# Patient Record
(demographics unavailable — no encounter records)

---

## 2024-11-14 NOTE — HISTORY OF PRESENT ILLNESS
[Home] : at home, [unfilled] , at the time of the visit. [Medical Office: ([facility])___] : at the medical office located in  [Verbal consent obtained from patient] : the patient, [unfilled] [de-identified] : 74 y/o man with multiple medication problems presents for pain management. Pt has been on multiple courses of Abx for wound infections, etc and had C diff. Stll with diarrhea. Recent ID labs pending.  Meds refilled. [place]-STOP [place].

## 2024-11-18 NOTE — HISTORY OF PRESENT ILLNESS
[FreeTextEntry1] : 75M h/o DM (A1c 7.1%), PAD s/p prior R fem endarterectomy (1/19/2024) complicated by groin infection, R foot OM s/p angio with balloon antioplasty 9/12/24 s/p R 1st ray and partial 5th ray amputation 9/13/24 with clean margin, emphysema, BPH, factor V Leiden with prior DVTs (on Eliquis), CDI p/w 5th stump site erythema and purulent drainage x 1 week.  Patient was admitted from 9/9-9/20/24 for R foot infection, found to have 1st and 5th toe OM. He underwent angio and amputation of 1st and 5th toe as above on 9/13 with clean margin. Outpatient WCx grew MRSA, PsA, Proteus rettgeri, and OR culture grew PsA.  He was on dapto/cefepime/flagyl in-house, then was sent home with doxy/cipro, total 2 weeks abx course (EOT 9/26/24).  he was also on C.diff ppx as well. After discharge, he initially did well and wound healed, no e/o infection. Then about a week PTA, he noticed erythema and purulent diranage from the 5th MT surgical site. He also developed chills. he had diarrhea as well. His wife urged him to go to the ED and he was admitted on 10/18. Upon arrival, he was afebrile, VSS, lab notable for WBC 11.24, Hgb 9.1, lactate 3, Cr 1.03.  CT RLE showed recurrent focal OM at R 5th TM amputation spump. He was put on vanc/zosyn and seen by podiatry, bone biopsy was done on 10/19 and sent for culture. 10/18 BCx growing MRSA. C.diff negative.  ID was consulted for abx rec. Exam notable for necrotic amputation stump at 1th and 5th MT head with surrounding erythema on foot.  Patient here with MRSA bacteremia 2/2 R foot OM at stump site and cellulitis.

## 2024-12-09 NOTE — PHYSICAL EXAM
[No Acute Distress] : no acute distress [Normal Sclera/Conjunctiva] : normal sclera/conjunctiva [Normal Outer Ear/Nose] : the outer ears and nose were normal in appearance [No JVD] : no jugular venous distention [No Respiratory Distress] : no respiratory distress  [No Accessory Muscle Use] : no accessory muscle use [Clear to Auscultation] : lungs were clear to auscultation bilaterally [Normal Rate] : normal rate  [Normal Affect] : the affect was normal [Alert and Oriented x3] : oriented to person, place, and time [de-identified] : R foot bandanged [de-identified] : severe fungal dermatitis, [de-identified] : using rollator

## 2024-12-09 NOTE — REVIEW OF SYSTEMS
[Fatigue] : fatigue [Wheezing] : no wheezing [Dyspnea on Exertion] : dyspnea on exertion [Joint Pain] : joint pain [Back Pain] : back pain [Dizziness] : no dizziness [Negative] : Heme/Lymph [FreeTextEntry9] : using rollator [de-identified] : dermatitis under pannus

## 2024-12-09 NOTE — HISTORY OF PRESENT ILLNESS
[de-identified] : Pt presents for f/u. Multiple medical problems and complicated last few months of care. Now s/p IV daptomycin 650 gm q24h along with IV Ceftriaxone 2gm q24h(10/20-11/30) and PO Vancomycin 125mg Q12h until 12/7. [facility]. Needs flu vaccine. Has fungal dermatitis. Ran out of Nystatin a while ago and has been using Lamisil OTC spray.

## 2024-12-20 NOTE — PHYSICAL EXAM
[de-identified] : General:  Appearance is consistent with chronologic age.  Cognitive/Language:  Awake, alert, and oriented to person, place, time and date Respiratory: Non-labored breathing, no audible wheezes Inspection: Right foot toe amputations. Right foot in cast Motor examination:  Upper Extremities: L 5/5, R 5/5; Lower extremities: L 5/5, R 5/5 Sensory examination:   Decreased sensation to light touch in b/l LEs Reflexes:   2+ b/l biceps, triceps, brachioradialis, patella and achilles Gait: Ambulates with rolling walker

## 2024-12-20 NOTE — ASSESSMENT
[FreeTextEntry1] : 75M with a PMH of Factor V Leiden (on Eliquis), DM (complicated by peripheral neuropathy), CAD, PAD (osteomyelitis and subsequent toe amputations on right foot), chronic neck pain, and chronic lower back pain presenting for initial evaluation.  Impression - Patient presents with severe pain in the lower back that is always present, localized to mid lower back, associated with radiation down b/l buttocks into posterior thighs, exacerbated by all activities (prolonged sitting/standing, lifting). He also complains of chronic neck pain involves entire neck, without radiation into UEs. He previously received cervical and lumbar ESIs with good results. However, given his Factor V Leiden, he will need clearance from his Hematologist to hold Eliquis and Cilostazol prior to any injections.  Plan - Obtain MR C-spine and L-spine - Obtain clearance from Hematologist to hold Eliquis for 3 days and Cilostazol for 2 days prior to any epidural injections - Specific levels of injections pending new imaging

## 2024-12-20 NOTE — HISTORY OF PRESENT ILLNESS
[FreeTextEntry1] : Patient is a 75M with a PMH of Factor V Leiden (on Eliquis), DM (complicated by peripheral neuropathy), CAD, PAD (osteomyelitis and subsequent toe amputations on right foot), chronic neck pain, and chronic lower back pain presenting for initial evaluation. - Previously followed with Dr. Jiménez who administered ESIs in neck (last was a LEAH at C7-T1 on 7/20/21 and lower back (last was a caudal on 9/14/21), as well as left shoulder intra-articular injections - He reports most severe pain in the lower back that is always present, localized to mid lower back, associated with radiation down b/l buttocks into posterior thighs, exacerbated by all activities (prolonged sitting/standing, lifting) - Has not had any recent MRIs, but recalls being told that he may need surgery for lumbar spinal stenosis - Neck pain involves entire neck, without radiation into UEs - Currently takes Tylenol and Tramadol 50mg qhs prn for pain

## 2024-12-24 NOTE — ASSESSMENT
[Arterial/Venous Disease] : arterial/venous disease [Ulcer Care] : ulcer care [FreeTextEntry1] : 75yoM who presented to the office w/an open wound of the R great toe after a traumatic injury w/avulsion of the skin over the IP joint, found to have significant femoral disease, now s/p R FEA/PFEA and distal R great toe amputation and evacuation of infected R groin post-op seroma that required readmission to [facility]. Pt recently was admitted due to his R lateral foot and R great toe wounds which have not been healing. He underwent RLE angiogram/ AT balloon angioplasty on 9/12/24, as well as s/p R 1st and 5th toe amp with podiatry. He returns today for a post-op visit. He is feeling well, has minimal pain at his right foot, denies drainage from incisions, fever, chills, but states that his wounds are not healing.  On exam, Right 1st and 5th toes amputation sites with fibrinous tissue, no granulation tissue, necrotic tissue at the 5th toe amputation site. RUE Arterial duplex was done in the office that demonstrated diffuse fibrocalcific plaque in femoropopliteal and tibial arteries, patent CFA endarterectomy, >50% stenosis in profunda, no flow in PT and peroneal arteries. We discussed the findings and recommended to proceed with RLE angio to possibly improve his distal arterial circulations. In the meantime, to follow up with his podiatrist, continue wound care. Pt agreed to proceed w/RLE Angio/PTA on 12/30/24. He will hold Eliquis for 2 days.

## 2024-12-24 NOTE — HISTORY OF PRESENT ILLNESS
[FreeTextEntry1] : 75yoM w/PMHx of T2DM, HTN, HLD, emphysema, BPH, factor V Leiden with prior DVTs (on Eliquis), PTSD, PAD with single vessel run off, chronic RLE wound s/p STSG (2019), bariatric surgery with complications 2005, C diff s/p treatment, s/p recent R FEA and 1st toe amputation for R 1st toe wound on 1/19/24, multiple recent admissions 3/20-3/25 and 3/31 to 4/3/24 for recurrent R foot wound who was admitted again with new MRSA infection of R 1st toe and lateral foot wound with drainage. Patient underwent RLE angiogram/ AT balloon angioplasty on 9/12/24, as well as s/p R 1st and 5th toe amp with podiatry. He returns today for a post-op visit. He is feeling well, has minimal pain at his right foot, denies drainage from incisions, fever, chills. He has wound care by [facility]. He states that his wounds are not healing well, he follows with Dr. Cheney (DPM) and has a follow up appointment with her on Friday. He denies pain, fever, chills.

## 2024-12-24 NOTE — PHYSICAL EXAM
[2+] : left 2+ [1+] : left 1+ [0] : left 0 [Ankle Swelling (On Exam)] : present [Ankle Swelling Bilaterally] : bilaterally  [Ankle Swelling On The Right] : mild [No HSM] : no hepatosplenomegaly [Skin Ulcer] : ulcer [Alert] : alert [Oriented to Person] : oriented to person [Oriented to Place] : oriented to place [Oriented to Time] : oriented to time [Calm] : calm [Varicose Veins Of Lower Extremities] : not present [] : not present [Abdomen Masses] : No abdominal masses [Abdomen Tenderness] : ~T ~M No abdominal tenderness [de-identified] : Obese habitus, pleasant [de-identified] : FROM throughout, strength 5/5x4 [de-identified] : Right 1st and 5th toes amputation sites with fibrinous tissue, no granulation tissue, necrotic tissue at the 5th toe amputaion site.

## 2024-12-24 NOTE — ASSESSMENT
[Arterial/Venous Disease] : arterial/venous disease [Ulcer Care] : ulcer care [FreeTextEntry1] : 75yoM who presented to the office w/an open wound of the R great toe after a traumatic injury w/avulsion of the skin over the IP joint, found to have significant femoral disease, now s/p R FEA/PFEA and distal R great toe amputation and evacuation of infected R groin post-op seroma that required readmission to [facility]. Pt recently was admitted due to his R lateral foot and R great toe wounds which have not been healing. He underwent RLE angiogram/ AT balloon angioplasty on 9/12/24, as well as s/p R 1st and 5th toe amp with podiatry. He returns today for a post-op visit. He is feeling well, has minimal pain at his right foot, denies drainage from incisions, fever, chills, but states that his wounds are not healing.  On exam, Right 1st and 5th toes amputation sites with fibrinous tissue, no granulation tissue, necrotic tissue at the 5th toe amputation site. RUE Arterial duplex was done in the office that demonstrated diffuse fibrocalcific plaque in femoropopliteal and tibial arteries, patent CFA endarterectomy, >50% stenosis in profunda, no flow in PT and peroneal arteries. We discussed the findings and recommended to proceed with RLE angio to possibly improve his distal arterial circulations. In the meantime, to follow up with his podiatrist, continue wound care. Pt agreed to proceed w/RLE Angio/PTA on 12/30/24. He will hold Eliquis for 2 days.

## 2024-12-24 NOTE — PROCEDURE
[FreeTextEntry1] : RUE Arterial duplex was done in the office that demonstrated diffuse fibrocalcific plaque in femoropopliteal and tibial arteries, patent CFA endarterectomy, >50% stenosis in profunda, no flow in PT and peroneal arteries

## 2024-12-24 NOTE — PHYSICAL EXAM
[2+] : left 2+ [1+] : left 1+ [0] : left 0 [Ankle Swelling (On Exam)] : present [Ankle Swelling Bilaterally] : bilaterally  [Ankle Swelling On The Right] : mild [No HSM] : no hepatosplenomegaly [Skin Ulcer] : ulcer [Alert] : alert [Oriented to Person] : oriented to person [Oriented to Place] : oriented to place [Oriented to Time] : oriented to time [Calm] : calm [Varicose Veins Of Lower Extremities] : not present [] : not present [Abdomen Masses] : No abdominal masses [Abdomen Tenderness] : ~T ~M No abdominal tenderness [de-identified] : Obese habitus, pleasant [de-identified] : FROM throughout, strength 5/5x4 [de-identified] : Right 1st and 5th toes amputation sites with fibrinous tissue, no granulation tissue, necrotic tissue at the 5th toe amputaion site.

## 2024-12-24 NOTE — ADDENDUM
[FreeTextEntry1] : I, Dr. Cruz Parra, personally performed the evaluation and management (E/M) services for this established patient who presents today with (an) existing condition(s).  That E/M includes conducting the examination, assessing all conditions, and (re)establishing/reinforcing a plan of care.  Today, my ACP, Angy SWANSON, was here to observe my evaluation and management services for this condition to be followed going forward.  I spent a total of 40 minutes in this encounter.

## 2024-12-24 NOTE — REVIEW OF SYSTEMS
[As Noted in HPI] : as noted in HPI [Skin Wound] : skin wound [Negative] : Musculoskeletal [Fever] : no fever [Chills] : no chills [Limb Pain] : no limb pain [Limb Swelling] : no limb swelling

## 2024-12-24 NOTE — ASSESSMENT
[Arterial/Venous Disease] : arterial/venous disease [Ulcer Care] : ulcer care [FreeTextEntry1] : 75yoM who presented to the office w/an open wound of the R great toe after a traumatic injury w/avulsion of the skin over the IP joint, found to have significant femoral disease, now s/p R FEA/PFEA and distal R great toe amputation and evacuation of infected R groin post-op seroma that required readmission to [facility]. Pt recently was admitted due to his R lateral foot and R great toe wounds which have not been healing. He underwent RLE angiogram/ AT balloon angioplasty on 9/12/24, as well as s/p R 1st and 5th toe amp with podiatry. He returns today for a post-op visit. He is feeling well, has minimal pain at his right foot, denies drainage from incisions, fever, chills, but states that his wounds are not healing.  On exam, Right 1st and 5th toes amputation sites with fibrinous tissue, no granulation tissue, necrotic tissue at the 5th toe amputation site. RUE Arterial duplex was done in the office that demonstrated diffuse fibrocalcific plaque in femoropopliteal and tibial arteries, patent CFA endarterectomy, >50% stenosis in profunda, no flow in PT and peroneal arteries. We discussed the findings and recommended to proceed with RLE angio to possibly improve his distal arterial circulations. In the meantime, to follow up with his podiatrist, continue wound care. Pt agreed to proceed w/RLE Angio/PTA on 12/30/24. He will hold Eliquis for 2 days.

## 2024-12-24 NOTE — PHYSICAL EXAM
[2+] : left 2+ [1+] : left 1+ [0] : left 0 [Ankle Swelling (On Exam)] : present [Ankle Swelling Bilaterally] : bilaterally  [Ankle Swelling On The Right] : mild [No HSM] : no hepatosplenomegaly [Skin Ulcer] : ulcer [Alert] : alert [Oriented to Person] : oriented to person [Oriented to Place] : oriented to place [Oriented to Time] : oriented to time [Calm] : calm [Varicose Veins Of Lower Extremities] : not present [] : not present [Abdomen Masses] : No abdominal masses [Abdomen Tenderness] : ~T ~M No abdominal tenderness [de-identified] : Obese habitus, pleasant [de-identified] : FROM throughout, strength 5/5x4 [de-identified] : Right 1st and 5th toes amputation sites with fibrinous tissue, no granulation tissue, necrotic tissue at the 5th toe amputaion site.

## 2025-01-13 NOTE — PHYSICAL EXAM
[No Acute Distress] : no acute distress [Normal Sclera/Conjunctiva] : normal sclera/conjunctiva [Normal Outer Ear/Nose] : the outer ears and nose were normal in appearance [No JVD] : no jugular venous distention [No Respiratory Distress] : no respiratory distress  [Normal Rate] : normal rate  [No Rash] : no rash [Normal Affect] : the affect was normal [Alert and Oriented x3] : oriented to person, place, and time [de-identified] : using rollator

## 2025-01-13 NOTE — HISTORY OF PRESENT ILLNESS
[de-identified] : 75yoM w/PMHx of T2DM, HTN, HLD, emphysema, BPH, factor V Leiden with prior DVTs (on Eliquis), PTSD, PAD with single vessel run off, chronic RLE wound s/p STSG (2019), bariatric surgery with complications 2005, C diff s/p treatment, s/p R FEA and 1st toe amputation for R 1st toe wound on 1/19/24, multiple recent admissions 3/20-3/25 and 3/31 to 4/3/24 for recurrent R foot wound who was admitted again with new MRSA infection of R 1st toe and lateral foot wound with drainage. Patient underwent RLE angiogram/ AT balloon angioplasty on 9/12/24, as well as s/p R 1st and 5th toe amp with podiatry. RUE Arterial duplex was done in the office that demonstrated diffuse fibrocalcific plaque in femoropopliteal and tibial arteries, patent CFA  Seeing luzmaria this week for a plan.   Seeing Dr Whitman for pain management. She plans to do epidural injection but Dr Whitman needs medication management advice given his comorbidities.  Previously followed with Dr. Jiménez who administered ESIs in neck (last was a LEAH at C7-T1 on 7/20/21 and lower back (last was a caudal on 9/14/21), as well as left shoulder intra-articular injections. He is on chronic opioids.

## 2025-01-13 NOTE — REVIEW OF SYSTEMS
[Fever] : no fever [Chills] : no chills [Joint Pain] : joint pain [Back Pain] : back pain [Headache] : no headache [Negative] : Heme/Lymph

## 2025-01-13 NOTE — ASSESSMENT
[FreeTextEntry1] : Pt can hold his Eliquis and Cilostazol for three and 2 days respectively prior to his injection and should restart medications after.

## 2025-01-13 NOTE — HEALTH RISK ASSESSMENT
[Good] : ~his/her~  mood as  good [No falls in past year] : Patient reported no falls in the past year [0] : 2) Feeling down, depressed, or hopeless: Not at all (0) [PHQ-2 Negative - No further assessment needed] : PHQ-2 Negative - No further assessment needed [Former] : Former [15-19] : 15-19 [> 15 Years] : > 15 Years [Feels Safe at Home] : Feels safe at home [Fully functional (bathing, dressing, toileting, transferring, walking, feeding)] : Fully functional (bathing, dressing, toileting, transferring, walking, feeding) [[id number]] : 0 [Change in mental status noted] : No change in mental status noted [Language] : denies difficulty with language [Behavior] : denies difficulty with behavior [Learning/Retaining New Information] : denies difficulty learning/retaining new information [Handling Complex Tasks] : denies difficulty handling complex tasks [Reasoning] : denies difficulty with reasoning [Spatial Ability and Orientation] : denies difficulty with spatial ability and orientation [With Family] : lives with family [de-identified] : poor ambulation, wife and daughter help

## 2025-02-10 NOTE — HISTORY OF PRESENT ILLNESS
[de-identified] : 75-year-old male presents as new patient for evaluation of chronic pain in the low back left shoulder and neck as well as the right lower extremity.  He was a previous patient of Dr. Jiménez who administered several epidural injections in the lumbar spine that reliably provided him 7 to 9 months of near complete relief pain in the low back as well as radiating to the right lower extremity glued hamstring to the calf and foot diffusely.  He also describes chronic stiffness and tightness in the neck as well as left shoulder pain which has also been managed in the past with injections with very positive result.  Over the past year he has undergone several vascular procedures in the right lower extremity including amputation of several toes

## 2025-02-10 NOTE — ASSESSMENT
[FreeTextEntry1] : 75-year-old male with chronic low back pain right lower extremity lumbar radiculopathy in the setting multilevel degenerative changes spinal stenosis

## 2025-02-10 NOTE — DISCUSSION/SUMMARY
[de-identified] : We reviewed the imaging in the office today as well as my impression of his symptoms.  He does seem to have had very positive relief from epidural injections in the past and I have made a referral to have these completed with one of my colleagues.  He understands that they also may be to admit able to administer some procedures for the neck and left shoulder.  I discussed surgical decompression as last start option should the symptoms fail to be ameliorated satisfactorily by the injections.  We also discussed the potential utility of physical therapy however would prefer to proceed with injections at this point given some limitations with regards to the recent right lower extremity procedures  I have spent greater than 60 minutes preparing to see the patient, collecting relevant history, performing a thorough history and physical examination, counseling the patient regarding my findings ordering the appropriate therapies and tests, communicating with other relevant healthcare professionals, documenting my encounter and coordinating care.

## 2025-02-10 NOTE — DISCUSSION/SUMMARY
[de-identified] : We reviewed the imaging in the office today as well as my impression of his symptoms.  He does seem to have had very positive relief from epidural injections in the past and I have made a referral to have these completed with one of my colleagues.  He understands that they also may be to admit able to administer some procedures for the neck and left shoulder.  I discussed surgical decompression as last start option should the symptoms fail to be ameliorated satisfactorily by the injections.  We also discussed the potential utility of physical therapy however would prefer to proceed with injections at this point given some limitations with regards to the recent right lower extremity procedures  I have spent greater than 60 minutes preparing to see the patient, collecting relevant history, performing a thorough history and physical examination, counseling the patient regarding my findings ordering the appropriate therapies and tests, communicating with other relevant healthcare professionals, documenting my encounter and coordinating care.

## 2025-02-10 NOTE — HISTORY OF PRESENT ILLNESS
[de-identified] : 75-year-old male presents as new patient for evaluation of chronic pain in the low back left shoulder and neck as well as the right lower extremity.  He was a previous patient of Dr. Jiménez who administered several epidural injections in the lumbar spine that reliably provided him 7 to 9 months of near complete relief pain in the low back as well as radiating to the right lower extremity glued hamstring to the calf and foot diffusely.  He also describes chronic stiffness and tightness in the neck as well as left shoulder pain which has also been managed in the past with injections with very positive result.  Over the past year he has undergone several vascular procedures in the right lower extremity including amputation of several toes

## 2025-02-10 NOTE — PHYSICAL EXAM
[Antalgic] : antalgic [UE/LE] : Sensory: Intact in bilateral upper & lower extremities [Normal DTR Reflexes] : DTR reflexes normal [Normal Proprioception] : sensation intact for proprioception [Normal] : Oriented to person, place, and time, insight and judgement were intact and the affect was normal [de-identified] : 4 view lumbar spine x-ray PACS 2/10/2025 Minimal anterolisthesis L4-5 relative maintenance of coronal and sagittal alignment  4 view cervical spine x-ray 2/10/2025 PACS Lordosis maintained with degenerative changes in the facets at multiple levels.  Some disc base generation at multiple subaxial levels with no evidence of instability  MRI lumbar spine 1/3/2025 [facility] Central stenosis at L4-5 L3-4 and L2-3  MRI cervical spine 1/3/2025 Motion artifact on the sagittal sequences.  Multilevel degenerative change without obvious cord compression or signal change.

## 2025-02-27 NOTE — PLAN
[FreeTextEntry1] : 75-year-old male with right foot wounds extending to bone. Patient seen and evaluated, 40 minutes spent. Patient status post hallux and fifth digit amputation. Surgical site wounds to bone: both wounds with fibrotic wound bed, mild clear drainage, no purulence, periwound erythema. Right foot dorsal third and fourth digit wound to bone, distal interphalangeal joints (DIPJ) on both digits exposed, scant clear drainage, wound bed fibronecrotic, periwound erythema. Excisional debridement to bone with 15-blade and bone cutter, in aseptic fashion, tolerated well. Dorsal tissue void reapproximated with Steri-Strips. Open biopsy then carried out at right foot third and fourth digit site and sent to both pathology  R foot tissue culture taken  Doxy rx'd for 30 days Santyl rx'd. wound size in total 50cm2, 450 g for 90days, wound care order Santyl to All wounds three times a week, then R foot 3rd and 4th digit need to be re-approximated by Ster Strip after Santyl application  Return in one week for re-eval

## 2025-02-27 NOTE — HISTORY OF PRESENT ILLNESS
[FreeTextEntry1] : 75-year-old male with a complex medical history including T2DM, HTN, HLD, emphysema, BPH, factor V Leiden with prior DVTs (on Eliquis), PTSD, PAD with single vessel run off,  presented for an initial visit for R foot wounds. He recently underwent a right femoral endarterectomy and partial first toe amputation on January 19, 2024, and experienced multiple recent hospital admissions in March and April 2024 for recurrent right foot wound infections, including a recent MRSA infection of the right first toe and lateral foot wound with drainage. He underwent a right lower extremity angiogram and AT balloon angioplasty on September 12, 2024, and subsequent total right first and fifth toe amputations. He reports moderate pain at his right foot, denies nausea vomiting, fever, or chills, but expresses concern about the wounds not healing well,  and referred to the [facility] by Dr. Mak Ruiz.  Social: pt states the polyneuropathy roots from the agent of [place] during his time in [place] when he was 18.  Most recent Vascular intervention was angioplasty to RLE by Matt Velez MD in early 02/2025. According to the Dr. Ruiz based on what pt described , since this intervention, R foot perfusion has been improving.

## 2025-03-06 NOTE — PLAN
[FreeTextEntry1] : 75M presents with right foot wounds to bone. - Patient seen and evaluated. - Right foot s/p hallux and 5th digit amputation wounds to subq, 3rd digit distal tuft wet gangrene to the DIPJ3 with DIPJ3 wound to bone, 4th digit DIPJ4 wound to bone, mild malodor, localized erythema, moderate serous drainage. Left foot 3rd interspace maceration, no open wounds, no acute signs of infection. - Applied betadine to the right foot followed by dry sterile dressing. - Recommend going to [facility] ED latest tomorrow to be admitted for right foot infection and TMA/LYNN. - Pt displayed verbal understanding and will go home to gather items today and then go to [facility] ED for admission. Provided pt with address. - Right Foot Bone Biopsy Culture: MRSA, Group B Strep, greater than 3 colonies of bacteria. - Recommend continuing Doxycycline until admitted tomorrow.  Aditional time spent after todays treatment. Also discussed surgical plan and recovery for transmetatarsal amputation and likely tendoachilles lengthening will be needed. I personally called his vascular surgeon Dr. Velez and spoke with his PA Nidia. The procedure was done in an outpt. facility  (angioplasty). I advised her that we were recommending TMA and admission. + MRSA and + osteomyelitis. He should heal due to optimizing of his blood flow currently. - RTC after admission at [facility] tomorrow.

## 2025-03-06 NOTE — HISTORY OF PRESENT ILLNESS
[FreeTextEntry1] : 75M presents with a complex medical history including T2DM, HTN, HLD, emphysema, BPH, factor V Leiden with prior DVTs (on Eliquis), PTSD, PAD with single vessel run off, presenting with right foot wounds. He recently underwent a right femoral endarterectomy and partial first toe amputation on 1/19/24, and experienced multiple recent hospital admissions in March and April 2024 for recurrent right foot wound infections, including a recent MRSA infection of the right first toe and lateral foot wound with drainage. He underwent a RLE angio and AT balloon plasty on 9/12/2024, and subsequent total right first and fifth toe amputations. At the end of 2/2025 pt had balloon plasty with Dr. Matt Velez. Reports 8/10 pain plantarly. Pt has been applying santyl with nursing 3x/week. Denies N/V/F/C/SOB.  Social: pt states the polyneuropathy roots from the agent of [place] during his time in [place] when he was 18.  According to the Dr. Ruiz based on what pt described, since this intervention, R foot perfusion has been improving.

## 2025-03-06 NOTE — PHYSICAL EXAM
[0] : left 0 [FreeTextEntry1] : RLE 1+ pitting edema [de-identified] : Right foot s/p hallux and 5th digit amputation wounds to subq, 3rd digit distal tuft wet gangrene to the DIPJ3 with DIPJ3 wound to bone, 4th digit DIPJ4 wound to bone, mild malodor, localized erythema, moderate serous drainage. Left foot 3rd interspace maceration, no open wounds, no acute signs of infection. [de-identified] : Right foot s/p 1st and 5th digit partial amputations. [de-identified] : BL sensation diminished to the digits.

## 2025-04-14 NOTE — PLAN
[FreeTextEntry1] : 75M s/p right foot transmetatarsal amputation and tendoachilles lengthening, closed, (DOS: 3/21) - Patient seen and evaluated. -3/21 s/p right foot transmetatarsal amputation and tendoachilles lengthening, closed,, staples intact, no hematoma, flaps warm -Intra-op findings: low concern for residual bone infection, low concern viability -Patient is able to weight bear as tolerated to RLE in CAM boot -rec silver aqua cell to be applied to TMA site every other day - RTC in 2 weeks

## 2025-04-14 NOTE — HISTORY OF PRESENT ILLNESS
[FreeTextEntry1] : 75M seen s/p right pop to PT bypass with rsvg, TMA with ptal on 3/21. Pt has been applying wet to dry dressing at the distal anastomosis site with nursing daily. Denies N/V/F/C/SOB.

## 2025-04-14 NOTE — PHYSICAL EXAM
[0] : left 0 [FreeTextEntry1] : RLE 1+ pitting edema [de-identified] : Right foot s/p 1st and 5th digit partial amputations. [de-identified] : Right foot s/p hallux and 5th digit amputation wounds to subq, 3rd digit distal tuft wet gangrene to the DIPJ3 with DIPJ3 wound to bone, 4th digit DIPJ4 wound to bone, mild malodor, localized erythema, moderate serous drainage. Left foot 3rd interspace maceration, no open wounds, no acute signs of infection. [de-identified] : BL sensation diminished to the digits.

## 2025-04-18 NOTE — PHYSICAL EXAM
[0] : right 0 [Ankle Swelling (On Exam)] : present [Ankle Swelling On The Right] : mild [Alert] : alert [Skin Ulcer] : ulcer [Oriented to Person] : oriented to person [Oriented to Place] : oriented to place [Oriented to Time] : oriented to time [Calm] : calm [de-identified] : NAD [de-identified] : NCAT [de-identified] : no resp distress [FreeTextEntry1] : RLE incision healing but not fully healed 2 small wounds in distal thigh/prox calf incision large deep wound on rt distal medial calf ~5in length x 2 in deep rt tma healing well

## 2025-04-18 NOTE — PROCEDURE
[FreeTextEntry1] : every other staple removed from RLE incision betadine, benzoin steri strips applied wet to dry dressing to rt medial distal calf incision 1/4 inch plain packing to 2 small wounds pt tolerated staple removal well

## 2025-04-18 NOTE — ASSESSMENT
[FreeTextEntry1] : Impression - arterial insuff s/p rt ak pop a to pta bypass w rgsv and rt foot tma, rle incision not fully healed and with a large wound on rt distal calf   Plan Conservative medical management - foot care and protection, exercise regimen local wound care with betadine to incision daily until next office visit wet to dry dressing to rt distal calf wound 3x/week 1/4 inch plain packing to smaller wounds on rt distal thigh/prox calf 3x/week continue to follow up with podiatry continue plavix and pletal 50 mg BID pt wants to continue his vascular care in this office d/w pt poss need for rle angio poss balloon angioplasty  will re-eval at next office will need ongoing arterial surveillance for lower extremities return to office in 2 weeks for rle wound check and staple removal [Arterial/Venous Disease] : arterial/venous disease [Medication Management] : medication management [Foot care/Footwear] : foot care/footwear [Ulcer Care] : ulcer care

## 2025-04-18 NOTE — HISTORY OF PRESENT ILLNESS
[FreeTextEntry1] : pt is s/p rt ak pop a to pta bypass w rgsv and rt tma with podiatry on 3/21/25 previous R profunda fem endarterectomy and R distal cfa endarterectomy at another facility hx of PE/DVT and factor v leiden rt foot tma is healing well pt has a large wound on rt distal medial calf and 2 small wounds on distal thigh/prox calf incision has minimal bloody drainage leg bypass incision not fully healed takes eliquis 5 mg BID, plavix and pletal 50 mg BID pt is [place]

## 2025-04-18 NOTE — DATA REVIEWED
[FreeTextEntry1] : 4/14/2025 RLE arterial doppler                                   patent rt ak pop to pta bypass                                    significant stenosis at distal anastomosis (velocity 248 cm/sec)

## 2025-04-28 NOTE — PROCEDURE
[FreeTextEntry1] : RLE staples from groin to calf removed  right distal medial calf staples left in place betadine, benzoin steri strips applied pt tolerated staple removal well

## 2025-04-28 NOTE — PHYSICAL EXAM
[0] : right 0 [Ankle Swelling (On Exam)] : present [Ankle Swelling On The Right] : mild [Skin Ulcer] : ulcer [Alert] : alert [Oriented to Person] : oriented to person [Oriented to Place] : oriented to place [Oriented to Time] : oriented to time [Calm] : calm [de-identified] : NAD [de-identified] : NCAT [de-identified] : no resp distress [FreeTextEntry1] : RLE distal calf incision healing but not fully healed  rt distal medial calf wound ~4in length x  2mm depth rt tma healing well

## 2025-04-28 NOTE — HISTORY OF PRESENT ILLNESS
[FreeTextEntry1] : pt is s/p rt ak pop a to pta bypass w rgsv and rt tma with podiatry on 3/21/25 previous R profunda fem endarterectomy and R distal cfa endarterectomy at another facility hx of PE/DVT and factor v leiden rt foot tma is healing well pt has a large wound on rt distal medial calf and 2 small wounds on distal thigh/prox calf incision has minimal bloody drainage leg bypass incision not fully healed takes eliquis 5 mg BID, plavix and pletal 50 mg BID pt is [place]  [de-identified] : pt is here to evaluate RLE incision s/p rt ak pop a to pta bypass w rgsv and rt tma with podiatry on 3/21/25 rle incision healing well rle distal calf wounds are improving tma staples were removed earlier today in podiatry office takes eliquis, plavix and pletal 50 mg bid

## 2025-04-28 NOTE — PHYSICAL EXAM
[0] : right 0 [Ankle Swelling (On Exam)] : present [Ankle Swelling On The Right] : mild [Skin Ulcer] : ulcer [Alert] : alert [Oriented to Person] : oriented to person [Oriented to Place] : oriented to place [Oriented to Time] : oriented to time [Calm] : calm [de-identified] : NAD [de-identified] : NCAT [de-identified] : no resp distress [FreeTextEntry1] : RLE distal calf incision healing but not fully healed  rt distal medial calf wound ~4in length x  2mm depth rt tma healing well

## 2025-04-28 NOTE — ASSESSMENT
[Arterial/Venous Disease] : arterial/venous disease [Medication Management] : medication management [Foot care/Footwear] : foot care/footwear [Ulcer Care] : ulcer care [FreeTextEntry1] : Impression - arterial insuff s/p rt ak pop a to pta bypass w rgsv and rt foot tma, rle incision not fully healed and with a large wound on rt distal calf, wounds are slow to heal   Plan Conservative medical management - foot care and protection, exercise regimen local wound care with betadine to incision daily until next office visit wet to dry dressing to rt distal calf wound 3x/week continue to follow up with podiatry continue plavix and pletal 50 mg BID pt wants to continue his vascular care in this office since rle calf wound is healing, will continue conservative medical management will need ongoing arterial surveillance for lower extremities return to office in 2 weeks for rle wound check and poss staple removal

## 2025-04-28 NOTE — PHYSICAL EXAM
[0] : left 0 [FreeTextEntry1] : RLE 1+ pitting edema [de-identified] : 3/21 s/p right foot TMA and LYNN. [de-identified] : 3/21 s/p right foot TMA and LYNN, staples intact, no hematoma formation, skin well coapted, central lateral incision site maceration. Right medial ankle wound to subq, granular wound bed, mild serous drainage, localized erythema, no acute signs of infection. Right posterior heel well-adhered eschar, no acute signs of infection. [de-identified] : BL sensation diminished to the level of the digits.

## 2025-04-28 NOTE — HISTORY OF PRESENT ILLNESS
[FreeTextEntry1] : 76M seen s/p right pop to PT bypass with rsvg, TMA with LYNN on 3/21. Pt reports vasc PA with Dr. Henning discussed re-evaluation for a RLE angio at his next visit, which is today 4/28. Nursing has been applying betadine and dry sterile dressing 3x/week. Denies N/V/F/C/SOB.  4/28/25 BS: 97mg/dL A1c: [facility] and will report a new A1c at the next visit

## 2025-04-28 NOTE — PLAN
[FreeTextEntry1] : 75M s/p right foot TMA and LYNN (DOS 3/21/25), right medial ankle wound to subq, right posterior heel eschar. - Pt seen and evaluated. - 3/21 s/p right foot TMA and LYNN, staples intact, no hematoma formation, skin well coapted, central lateral incision site maceration. Right medial ankle wound to subq, granular wound bed, mild serous drainage, localized erythema, no acute signs of infection. Right posterior heel well-adhered eschar, no acute signs of infection. - All right foot TMA site staples removed with a sterile staple remover. - Used a sterile #15 blade to excisionally debride the right foot TMA site and medial ankle wounds down to the level of but not beyond subq. - Applied santyl to the right foot and ankle wound followed by dry sterile dressing to be continued 3x/week at home by VNS. - Pt states he has enough santyl at home and does not require a new prescription today. - Covered RLE bypass site with wet to dry dressing temporarily as pt is seeing Dr. Henning at 2pm today. - Intra-Op Findings: Low concern for residual bone infection, low concern viability. - Patient to continue weightbearing as tolerated to RLE in CAM boot. - Recommend silver aqua cell to be applied to TMA site every other day. - RTC in 2 weeks.

## 2025-04-28 NOTE — HISTORY OF PRESENT ILLNESS
[FreeTextEntry1] : pt is s/p rt ak pop a to pta bypass w rgsv and rt tma with podiatry on 3/21/25 previous R profunda fem endarterectomy and R distal cfa endarterectomy at another facility hx of PE/DVT and factor v leiden rt foot tma is healing well pt has a large wound on rt distal medial calf and 2 small wounds on distal thigh/prox calf incision has minimal bloody drainage leg bypass incision not fully healed takes eliquis 5 mg BID, plavix and pletal 50 mg BID pt is [place]  [de-identified] : pt is here to evaluate RLE incision s/p rt ak pop a to pta bypass w rgsv and rt tma with podiatry on 3/21/25 rle incision healing well rle distal calf wounds are improving tma staples were removed earlier today in podiatry office takes eliquis, plavix and pletal 50 mg bid

## 2025-05-12 NOTE — ASSESSMENT
[Arterial/Venous Disease] : arterial/venous disease [Medication Management] : medication management [Foot care/Footwear] : foot care/footwear [Ulcer Care] : ulcer care [FreeTextEntry1] : Impression - arterial insuff s/p rt ak pop a to pta bypass w rgsv and rt foot tma, rle distal calf wounds are healing well   Plan Conservative medical management - foot care and protection, exercise regimen Discontinue santyl. Start 1/4 strength dakins wet to dry dressing to rt distal posterior calf wound 3x/week Continue normal saline wet to dry dressing to rt distal medial calf wound 3x/week continue to follow up with podiatry continue plavix and pletal 50 mg BID pt wants to continue his vascular care in this office return to office in 6 months October 2025 to evaluate RLE bypass with arterial duplex return to office in 3 weeks to re-eval rle calf wounds DISCHARGE

## 2025-05-12 NOTE — DATA REVIEWED
[FreeTextEntry1] : 4/14/2025 RLE arterial doppler                                   patent rt ak pop to pta bypass                                    significant stenosis at distal anastomosis (velocity 248 cm/sec) - - -

## 2025-05-12 NOTE — HISTORY OF PRESENT ILLNESS
[FreeTextEntry1] : pt is s/p rt ak pop a to pta bypass w rgsv and rt tma with podiatry on 3/21/25 previous R profunda fem endarterectomy and R distal cfa endarterectomy at another facility hx of PE/DVT and factor v leiden rt foot tma is healing well pt has a large wound on rt distal medial calf and 2 small wounds on distal thigh/prox calf incision has minimal bloody drainage leg bypass incision not fully healed takes eliquis 5 mg BID, plavix and pletal 50 mg BID pt is [place]  [de-identified] : pt is here to evaluate RLE wounds s/p rt ak pop a to pta bypass w rgsv and rt tma with podiatry on 3/21/25 rle distal calf wounds x 2 healing well tma is slow to heal but almost healed takes eliquis, plavix and pletal 50 mg bid

## 2025-05-12 NOTE — PLAN
[FreeTextEntry1] : 75M s/p right foot TMA and LYNN (DOS 3/21/25), right medial ankle wound to subq, right posterior heel eschar. - Pt seen and evaluated. - 3/21 s/p right foot TMA and LYNN,  skin coapted, central lateral stump wound with fibrogranular wound bed. Right medial ankle wound to subq, granular wound bed, mild serous drainage, no acute signs of infection. Right posterior heel punctate wound to subQ.  lower leg staples to bypass incision left intact. - Used a sterile #15 blade to excisionally debride the right foot TMA site and medial ankle wounds down to the level of but not beyond subq. - Applied santyl to the right foot and ankle wound followed by dry sterile dressing to be continued 3x/week at home by VNS. - Covered RLE bypass site with DSD temporarily as pt is seeing Dr. Henning at 2:30 pm today. - Patient to continue weightbearing as tolerated to RLE in CAM boot. - Recommended to continue with silver aqua cell to be applied to TMA site every other day. - RTC in 3 weeks.

## 2025-05-12 NOTE — PHYSICAL EXAM
[0] : left 0 [FreeTextEntry1] : RLE 1+ pitting edema [de-identified] : 3/21 s/p right foot TMA and LYNN. [de-identified] : 3/21 s/p right foot TMA and LYNN, staples intact, no hematoma formation, skin well coapted, central lateral incision site maceration. Right medial ankle wound to subq, granular wound bed, mild serous drainage, localized erythema, no acute signs of infection. Right posterior heel well-adhered eschar, no acute signs of infection. [de-identified] : BL sensation diminished to the level of the digits.

## 2025-05-12 NOTE — ASSESSMENT
[Arterial/Venous Disease] : arterial/venous disease [Medication Management] : medication management [Foot care/Footwear] : foot care/footwear [Ulcer Care] : ulcer care [FreeTextEntry1] : Impression - arterial insuff s/p rt ak pop a to pta bypass w rgsv and rt foot tma, rle distal calf wounds are healing well   Plan Conservative medical management - foot care and protection, exercise regimen Discontinue santyl. Start 1/4 strength dakins wet to dry dressing to rt distal posterior calf wound 3x/week Continue normal saline wet to dry dressing to rt distal medial calf wound 3x/week continue to follow up with podiatry continue plavix and pletal 50 mg BID pt wants to continue his vascular care in this office return to office in 6 months October 2025 to evaluate RLE bypass with arterial duplex return to office in 3 weeks to re-eval rle calf wounds

## 2025-05-12 NOTE — PHYSICAL EXAM
[0] : right 0 [Ankle Swelling (On Exam)] : present [Ankle Swelling On The Right] : mild [Skin Ulcer] : ulcer [Alert] : alert [Oriented to Person] : oriented to person [Oriented to Place] : oriented to place [Oriented to Time] : oriented to time [Calm] : calm [de-identified] : NAD [de-identified] : NCAT [de-identified] : no resp distress [FreeTextEntry1] : RLE distal calf incision not fully healed  rt distal medial calf wounds are improving rt tma healing well

## 2025-05-12 NOTE — PROCEDURE
[FreeTextEntry1] : all staples removed from rt distal medial calf incision wet to dry dressing with 1/4 strength dakins solution wound next to it with normal saline wet to dry

## 2025-05-12 NOTE — PHYSICAL EXAM
[0] : right 0 [Ankle Swelling (On Exam)] : present [Ankle Swelling On The Right] : mild [Skin Ulcer] : ulcer [Alert] : alert [Oriented to Person] : oriented to person [Oriented to Place] : oriented to place [Oriented to Time] : oriented to time [Calm] : calm [de-identified] : NAD [de-identified] : NCAT [de-identified] : no resp distress [FreeTextEntry1] : RLE distal calf incision not fully healed  rt distal medial calf wounds are improving rt tma healing well

## 2025-05-12 NOTE — HISTORY OF PRESENT ILLNESS
[FreeTextEntry1] : 76M seen s/p right pop to PT bypass with rsvg, TMA with LYNN on 3/21. Pt reports vasc PA with Dr. Henning discussed re-evaluation for a RLE angio at his next visit, which is today 4/28. Nursing has been applying betadine and dry sterile dressing 3x/week. Denies N/V/F/C/SOB.  5/12/25 BS: 99 mg/dL  may 2025: A1c:5.7%

## 2025-05-12 NOTE — HISTORY OF PRESENT ILLNESS
[FreeTextEntry1] : pt is s/p rt ak pop a to pta bypass w rgsv and rt tma with podiatry on 3/21/25 previous R profunda fem endarterectomy and R distal cfa endarterectomy at another facility hx of PE/DVT and factor v leiden rt foot tma is healing well pt has a large wound on rt distal medial calf and 2 small wounds on distal thigh/prox calf incision has minimal bloody drainage leg bypass incision not fully healed takes eliquis 5 mg BID, plavix and pletal 50 mg BID pt is [place]  [de-identified] : pt is here to evaluate RLE wounds s/p rt ak pop a to pta bypass w rgsv and rt tma with podiatry on 3/21/25 rle distal calf wounds x 2 healing well tma is slow to heal but almost healed takes eliquis, plavix and pletal 50 mg bid

## 2025-06-02 NOTE — PLAN
[FreeTextEntry1] : 75M s/p right foot TMA and LYNN (DOS 3/21/25), right medial ankle wound to subq, right posterior heel eschar. - Pt seen and evaluated. - 3/21 s/p right foot TMA and LYNN,  skin coapted, central lateral stump wound with fibrogranular wound bed. Right medial ankle wound to subq, granular wound bed, mild serous drainage, no acute signs of infection.  - Used a sterile #10 blade to excisionally debrided to the level of and not beyond the fibrofatty tissue of the right foot TMA site.  Pt tolerated well.  - Applied santyl to the right foot followed by dry sterile dressing to be continued 3x/week at home by VNS. - Pt is seeing Dr. Henning at 2:30 pm today. - Darco post op shoe provided to the pt - Patient to continue weightbearing as tolerated in darco post op shoe to RLE - Recommended to continue with santyl and DSD to be applied to TMA site every other day. - RTC in 3 weeks.

## 2025-06-02 NOTE — PHYSICAL EXAM
[0] : left 0 [Alert] : alert [Oriented to Person] : oriented to person [Oriented to Place] : oriented to place [FreeTextEntry1] : RLE 1+ pitting edema [de-identified] : 3/21 s/p right foot TMA and LYNN. [de-identified] : 3/21 s/p right foot TMA and LYNN,  skin coapted, central lateral stump wound with fibrogranular wound bed. Right medial ankle wound to subq, granular wound bed, mild serous drainage, no acute signs of infection.  [de-identified] : BL sensation diminished to the level of the digits.

## 2025-06-02 NOTE — HISTORY OF PRESENT ILLNESS
[FreeTextEntry1] : 76M seen s/p right pop to PT bypass with rsvg, TMA with LYNN on 3/21. Pt reports vasc PA with Dr. Henning discussed re-evaluation for a RLE angio at his next visit, which is today 4/28. Nursing has been applying santyl and dry sterile dressing 3x/week to the right TMA wound. Denies N/V/F/C/SOB.  6/2/25 BS: 99 mg/dL May 2025: A1c:5.7%

## 2025-06-03 NOTE — HEALTH RISK ASSESSMENT
[Assistive Device] : Patient uses an assistive device [0] : 1) Little interest or pleasure doing things: Not at all (0) [1] : 2) Feeling down, depressed, or hopeless for several days (1) [PHQ-2 Negative - No further assessment needed] : PHQ-2 Negative - No further assessment needed [[id number]] : 1 [Yes] : Reviewed medication list for presence of high-risk medications. [Benzodiazepines] : benzodiazepines [Opioids] : opioids [Blood Thinners] : blood thinners [Muscle Relaxants] : muscle relaxants [Sedatives] : sedatives [Former] : Former [10-14] : 10-14 [> 15 Years] : > 15 Years [HIV test declined] : HIV test declined [Hepatitis C test declined] : Hepatitis C test declined [Change in mental status noted] : No change in mental status noted [Language] : denies difficulty with language [Behavior] : denies difficulty with behavior [Learning/Retaining New Information] : denies difficulty learning/retaining new information [Handling Complex Tasks] : denies difficulty handling complex tasks [Reasoning] : denies difficulty with reasoning [Spatial Ability and Orientation] : denies difficulty with spatial ability and orientation [With Family] : lives with family [On disability] : on disability [] :  [Feels Safe at Home] : Feels safe at home [Fully functional (bathing, dressing, toileting, transferring, walking, feeding)] : Fully functional (bathing, dressing, toileting, transferring, walking, feeding) [Reports changes in hearing] : Reports no changes in hearing [Reports changes in vision] : Reports no changes in vision [Reports changes in dental health] : Reports no changes in dental health [Smoke Detector] : smoke detector [Safety elements used in home] : safety elements used in home [de-identified] : needs some assistance due to surgeries

## 2025-06-03 NOTE — HISTORY OF PRESENT ILLNESS
[FreeTextEntry1] : pt is s/p rt ak pop a to pta bypass w rgsv and rt tma with podiatry on 3/21/25 previous R profunda fem endarterectomy and R distal cfa endarterectomy at another facility hx of PE/DVT and factor v leiden rt foot tma is healing well pt has a large wound on rt distal medial calf and 2 small wounds on distal thigh/prox calf incision has minimal bloody drainage leg bypass incision not fully healed takes eliquis 5 mg BID, plavix and pletal 50 mg BID pt is [place]  [de-identified] : pt is here to evaluate RLE wounds s/p rt ak pop a to pta bypass w rgsv and rt tma with podiatry on 3/21/25 rle distal medial calf wounds x 2 healing better follows up with podiatry every 3 weeks for R foot TMA wound check takes eliquis, plavix and pletal 50 mg bid

## 2025-06-03 NOTE — PHYSICAL EXAM
[No Acute Distress] : no acute distress [Well Nourished] : well nourished [Soft] : abdomen soft [Non Tender] : non-tender [No HSM] : no HSM [Normal Bowel Sounds] : normal bowel sounds [Normal] : no posterior cervical lymphadenopathy and no anterior cervical lymphadenopathy [Normal Affect] : the affect was normal [Alert and Oriented x3] : oriented to person, place, and time [Normal Insight/Judgement] : insight and judgment were intact [de-identified] : R foot in boot [de-identified] : multiple ventral hernias

## 2025-06-03 NOTE — REVIEW OF SYSTEMS
[Joint Pain] : joint pain [Back Pain] : back pain [Negative] : Heme/Lymph [de-identified] : wounds discussed

## 2025-06-03 NOTE — ASSESSMENT
[Arterial/Venous Disease] : arterial/venous disease [Medication Management] : medication management [Foot care/Footwear] : foot care/footwear [Ulcer Care] : ulcer care [FreeTextEntry1] : Impression - arterial insuff s/p rt ak pop a to pta bypass w rgsv and rt foot tma, rle distal calf wounds are healing well   Plan Conservative medical management - foot care and protection, exercise regimen Normal saline wet to dry dressing to rt medial calf wounds 4x/week continue to follow up with podiatry continue plavix and pletal 50 mg BID pt wants to continue his vascular care in this office return to office in 6 months October 2025 to evaluate RLE bypass with arterial duplex and bilateral lower extremities with UBALDO return to office in 3 weeks to re-eval rle calf wounds

## 2025-06-03 NOTE — HISTORY OF PRESENT ILLNESS
[FreeTextEntry1] : pt is s/p rt ak pop a to pta bypass w rgsv and rt tma with podiatry on 3/21/25 previous R profunda fem endarterectomy and R distal cfa endarterectomy at another facility hx of PE/DVT and factor v leiden rt foot tma is healing well pt has a large wound on rt distal medial calf and 2 small wounds on distal thigh/prox calf incision has minimal bloody drainage leg bypass incision not fully healed takes eliquis 5 mg BID, plavix and pletal 50 mg BID pt is [place]  [de-identified] : pt is here to evaluate RLE wounds s/p rt ak pop a to pta bypass w rgsv and rt tma with podiatry on 3/21/25 rle distal medial calf wounds x 2 healing better follows up with podiatry every 3 weeks for R foot TMA wound check takes eliquis, plavix and pletal 50 mg bid

## 2025-06-03 NOTE — END OF VISIT
Yes, okay to order 3 therapies for this patient.  Thanks.   [Time Spent: ___ minutes] : I have spent [unfilled] minutes of time on the encounter which excludes teaching and separately reported services.

## 2025-06-03 NOTE — HISTORY OF PRESENT ILLNESS
[de-identified] : 75 year old male with a history of Type 2 DM, HTN, PAD, CAD, PE/DVT (on Eliquis, s/p IVC filter), Factor V Leiden, COPD, recurrent osteomyelitis, C.Diff, PTSD, BPH is presents for Annual Wellness exam.  He was in an out of the hospital all year with osteomyelitis. S/p multiple R foot procedures including prior right 1stand 5th toe amputations in September 2024 complicated by post-op purulent osteomyelitis. In February 2025, he recently had a balloon angioplasty of his right leg and since the procedure he has some mild pain and tightness by his dorsum. He also has 3 coronary stents that were checked at that time and he mentions that they were "cleaned." He reports that there were noted episodes of bradycardia and the team recommended he have a PPM but he wanted to do as an outpatient. Wants EP referral in [place].  On 03/05/25, he had a visit with his vascular surgeon for evaluation of his right third toe and possible debridement. Per joint decision making, the patient was agreeable to have a more proximal resection.Infectious disease, vascular surgery, and podiatry were all consulted onadmission. Infectious disease recommended starting him on vancomycin on zosyn. On 3/21 he was taken to surgery for RLE pop-PT bypass with vascular surgery and right foot TMA and tendoachilles lengthening with podiatry. Patient was started on PO vanc for c diff ppx on 3/23. Camboot fitted on 3/24. PM&R was consulted and recommended acute rehab. Patient was cleared to discharge on 3/28 to [facility] and returned home in early APril with home care. Saw VASC, would care and podiatry this week.  Remains in boot. Nighttime insulin requirements lkss since being in the hospital - went from 36 u qPM to 12 units. Metformin d/c'd in hospital.  Remains on chronic narcotics. Saw PAIN MANAGEMENT this winter but can't do anything interventional at this juncture because he can't stop his Eliquis.

## 2025-06-03 NOTE — PHYSICAL EXAM
[0] : right 0 [Ankle Swelling (On Exam)] : present [Ankle Swelling On The Right] : mild [Skin Ulcer] : ulcer [Alert] : alert [Oriented to Person] : oriented to person [Oriented to Place] : oriented to place [Oriented to Time] : oriented to time [Calm] : calm [de-identified] : NAD [de-identified] : NCAT [de-identified] : no resp distress [FreeTextEntry1] :  rt distal medial calf wounds are healing with good granulation tissue

## 2025-06-03 NOTE — PHYSICAL EXAM
[0] : right 0 [Ankle Swelling (On Exam)] : present [Ankle Swelling On The Right] : mild [Skin Ulcer] : ulcer [Alert] : alert [Oriented to Person] : oriented to person [Oriented to Place] : oriented to place [Oriented to Time] : oriented to time [Calm] : calm [de-identified] : NAD [de-identified] : NCAT [de-identified] : no resp distress [FreeTextEntry1] :  rt distal medial calf wounds are healing with good granulation tissue

## 2025-06-03 NOTE — ASSESSMENT
[FreeTextEntry1] : 76 year is here for Medicare annual wellness exam. his cognitive function was normal. Please refer to an appropriate section above for a list of providers that are regularly involved in the patient's care. See above for full details of history and physical. The patient's care team was reviewed and documented above. Listed above are the preventative services for which the patient may be due. I informed the patient with a list and offered assistance in scheduling the appropriate exams. Labs sent. I refilled his oxycodone but he needs to have this managed by [facility]> He is amenable. F/u care with vascular. Referred to EP. [Vaccines Reviewed] : Immunizations reviewed today. Please see immunization details in the vaccine log within the immunization flowsheet.

## 2025-06-23 NOTE — PHYSICAL EXAM
[0] : right 0 [Ankle Swelling (On Exam)] : present [Ankle Swelling On The Right] : mild [Skin Ulcer] : ulcer [Alert] : alert [Oriented to Person] : oriented to person [Oriented to Place] : oriented to place [Oriented to Time] : oriented to time [Calm] : calm [de-identified] : NAD [de-identified] : NCAT [de-identified] : no resp distress [FreeTextEntry1] :  rt distal medial calf wounds are healing with good granulation tissue and serous drainage  [de-identified] : Elvin Cranial nerves 2-12 elvin grossly intact  [de-identified] : cooperative

## 2025-06-23 NOTE — ASSESSMENT
[Arterial/Venous Disease] : arterial/venous disease [Medication Management] : medication management [Foot care/Footwear] : foot care/footwear [Ulcer Care] : ulcer care [FreeTextEntry1] : Impression - arterial insuff s/p rt ak pop a to pta bypass w rgsv and rt foot tma, rle distal calf wounds are healing well   Plan Conservative medical management - foot care and protection, exercise regimen woundcare changed  continue to follow up with podiatry continue plavix and pletal 50 mg BID pt wants to continue his vascular care in this office return to office in 6 months October 2025 to evaluate RLE bypass with arterial duplex and bilateral lower extremities with UBALDO return to office in 4 weeks to re-eval rle calf wounds

## 2025-06-23 NOTE — HISTORY OF PRESENT ILLNESS
[FreeTextEntry1] : pt is s/p rt ak pop a to pta bypass w rgsv and rt tma with podiatry on 3/21/25 previous R profunda fem endarterectomy and R distal cfa endarterectomy at another facility hx of PE/DVT and factor v leiden rt foot tma is healing well pt has a large wound on rt distal medial calf and 2 small wounds on distal thigh/prox calf incision has minimal bloody drainage leg bypass incision not fully healed takes eliquis 5 mg BID, plavix and pletal 50 mg BID pt is [place]  [de-identified] : pt is here to evaluate RLE wounds s/p rt ak pop a to pta bypass w rgsv and rt tma with podiatry on 3/21/25 rle distal medial calf wounds x 2 healing better follows up with podiatry every 3 weeks for R foot TMA wound check takes eliquis, plavix and pletal 50 mg bid pt presents for woundcare f/u

## 2025-06-27 NOTE — HISTORY OF PRESENT ILLNESS
[FreeTextEntry1] : 76M seen s/p right pop to PT bypass with rsvg, TMA with LYNN on 3/21. Pt reports vasc PA with Dr. Henning discussed re-evaluation for a RLE angio at his next visit, which is today 4/28. Nursing has been applying santyl and dry sterile dressing 3x/week to the right TMA wound. Denies N/V/F/C/SOB.  6/23/25 BS: 102 mg/dL May 2025: A1c:5.7%

## 2025-06-27 NOTE — PHYSICAL EXAM
[0] : left 0 [Alert] : alert [Oriented to Person] : oriented to person [Oriented to Place] : oriented to place [FreeTextEntry1] : RLE 1+ pitting edema [de-identified] : 3/21 s/p right foot TMA and LYNN. [de-identified] : 3/21 s/p right foot TMA and LYNN,  skin coapted, central lateral stump wound with fibrogranular wound bed. Right medial ankle wound to subq, granular wound bed, mild serous drainage, no acute signs of infection.  [de-identified] : BL sensation diminished to the level of the digits.

## 2025-06-27 NOTE — PLAN
[FreeTextEntry1] : 75M s/p right foot TMA and LYNN (DOS 3/21/25), right medial ankle wound to subq, right posterior heel eschar. - Pt seen and evaluated. - 3/21 s/p right foot TMA and LYNN,  skin coapted, central lateral stump wound with fibrogranular wound bed. Right medial ankle wound to subq, granular wound bed, mild serous drainage, no acute signs of infection.  - Used a sterile #10 blade to excisionally debrided to the level of and not beyond the fibrofatty tissue of the right foot lateral TMA site.  Pt tolerated well with no acute bleed after.  - Applied santyl to the right foot followed by dry sterile dressing to be continued 3x/week at home by VNS. - Darco post op shoe provided to the pt - Patient to continue weightbearing as tolerated in darco post op shoe to RLE - Recommended to continue with santyl and DSD to be applied to TMA site every other day. - RTC in 4 weeks.

## 2025-06-27 NOTE — PHYSICAL EXAM
[0] : left 0 [Alert] : alert [Oriented to Person] : oriented to person [Oriented to Place] : oriented to place [FreeTextEntry1] : RLE 1+ pitting edema [de-identified] : 3/21 s/p right foot TMA and LYNN. [de-identified] : 3/21 s/p right foot TMA and LYNN,  skin coapted, central lateral stump wound with fibrogranular wound bed. Right medial ankle wound to subq, granular wound bed, mild serous drainage, no acute signs of infection.  [de-identified] : BL sensation diminished to the level of the digits.

## 2025-07-21 NOTE — PHYSICAL EXAM
[0] : right 0 [Ankle Swelling (On Exam)] : present [Ankle Swelling On The Right] : mild [Skin Ulcer] : ulcer [Alert] : alert [Oriented to Person] : oriented to person [Oriented to Place] : oriented to place [Oriented to Time] : oriented to time [Calm] : calm [de-identified] : NAD [de-identified] : NCAT [de-identified] : no resp distress [FreeTextEntry1] :  rt distal medial calf wounds are healing with good granulation tissue and serous drainage  red macerated skin on distal right leg and forefoot right TMA is healing well [de-identified] : Elvin Cranial nerves 2-12 elvin grossly intact  [de-identified] : cooperative

## 2025-07-21 NOTE — HISTORY OF PRESENT ILLNESS
[FreeTextEntry1] : pt is s/p rt ak pop a to pta bypass w rgsv and rt tma with podiatry on 3/21/25 previous R profunda fem endarterectomy and R distal cfa endarterectomy at another facility hx of PE/DVT and factor v leiden rt foot tma is healing well pt has a large wound on rt distal medial calf and 2 small wounds on distal thigh/prox calf incision has minimal bloody drainage leg bypass incision not fully healed takes eliquis 5 mg BID, plavix and pletal 50 mg BID pt is [place]  [de-identified] : pt is here to evaluate RLE wounds s/p rt ak pop a to pta bypass w rgsv and rt tma with podiatry on 3/21/25 rle distal medial calf wounds x 2 healing better complains of light drainage and macerated red skin on distal leg and forefoot follows up with podiatry every 3 weeks for R foot TMA wound check complains of constant bilateral hands and fingers coolness  states fingers can get very white at times takes eliquis, plavix and pletal 50 mg bid pt wants to get his carotid evaluated states he has "60% blockage in my neck"

## 2025-07-21 NOTE — ASSESSMENT
[Arterial/Venous Disease] : arterial/venous disease [Medication Management] : medication management [Foot care/Footwear] : foot care/footwear [Ulcer Care] : ulcer care [FreeTextEntry1] : Impression - arterial insuff s/p rt ak pop a to pta bypass w rgsv and rt foot tma, rle distal calf wounds are healing well, raynaud's disease   Plan Conservative medical management - foot care and protection, exercise regimen wound care order update to xeroform to calf wounds, xtrasorb, kerlix and ace to right leg 2x/week continue to follow up with podiatry continue plavix and pletal 50 mg BID return to office in 3 months October 2025 to evaluate RLE bypass with arterial duplex and bilateral lower extremities with UBALDO return to office in 4 weeks to re-eval rle calf wounds and to evaluate carotid arteries with duplex

## 2025-07-28 NOTE — PLAN
[FreeTextEntry1] : 75M s/p right foot TMA and LYNN (DOS 3/21/25) - Pt seen and evaluated. - 3/21 s/p right foot TMA and LYNN, central lateral stump wound with fibrotic wound bed, no drainage, no purulence, no acute signs of infection.  - Used a sterile #15 blade to excisionally debrided to the level of and not beyond the fibrofatty tissue of the right foot lateral TMA site. The wound was packed with 1/4 in iodoform packing, followed by 4x4 gauze, ABD, and kerlix. Pt tolerated well with no acute bleeding after.  - Patient to continue wearing CAM boot.  - Recommended to continue with packing, Santylm and DSD to be applied to TMA site every other day. - RTC in 4 weeks.

## 2025-07-28 NOTE — HISTORY OF PRESENT ILLNESS
[FreeTextEntry1] : 76M seen s/p right pop to PT bypass with rsvg, TMA with LYNN on 3/21. Pt reports vasc PA with Dr. Henning discussed re-evaluation for a RLE angio at his next visit, which is today 4/28. Nursing has been applying santyl and dry sterile dressing 3x/week to the right TMA wound. The patient states that he has been seen by Dr. Henning for right leg wound care. The patient states that wound care nurse comes to him M,W,F every week for wound care. Denies N/V/F/C/SOB.  6/23/25 BS: 102 mg/dL May 2025: A1c:5.7%

## 2025-07-28 NOTE — PHYSICAL EXAM
[0] : left 0 [Alert] : alert [Oriented to Person] : oriented to person [Oriented to Place] : oriented to place [Please See PDF for [employer]] : Please See PDF for [employer]. [FreeTextEntry1] : RLE 1+ pitting edema [de-identified] : 3/21 s/p right foot TMA and LYNN. [de-identified] : BL sensation diminished to the level of the digits.

## 2025-07-28 NOTE — PHYSICAL EXAM
[0] : left 0 [Alert] : alert [Oriented to Person] : oriented to person [Oriented to Place] : oriented to place [Please See PDF for [employer]] : Please See PDF for [employer]. [FreeTextEntry1] : RLE 1+ pitting edema [de-identified] : 3/21 s/p right foot TMA and LYNN. [de-identified] : BL sensation diminished to the level of the digits.